# Patient Record
Sex: FEMALE | Race: WHITE
[De-identification: names, ages, dates, MRNs, and addresses within clinical notes are randomized per-mention and may not be internally consistent; named-entity substitution may affect disease eponyms.]

---

## 2019-03-24 ENCOUNTER — HOSPITAL ENCOUNTER (EMERGENCY)
Dept: HOSPITAL 33 - ED | Age: 69
Discharge: HOME | End: 2019-03-24
Payer: COMMERCIAL

## 2019-03-24 VITALS — DIASTOLIC BLOOD PRESSURE: 101 MMHG | HEART RATE: 74 BPM | OXYGEN SATURATION: 99 % | SYSTOLIC BLOOD PRESSURE: 166 MMHG

## 2019-03-24 DIAGNOSIS — R58: ICD-10-CM
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
## 2019-03-24 DIAGNOSIS — S52.532A: Primary | ICD-10-CM

## 2019-03-24 PROCEDURE — 99283 EMERGENCY DEPT VISIT LOW MDM: CPT

## 2019-03-24 PROCEDURE — 73130 X-RAY EXAM OF HAND: CPT

## 2019-03-24 PROCEDURE — 29126 APPL SHORT ARM SPLINT DYN: CPT

## 2019-03-24 PROCEDURE — 73090 X-RAY EXAM OF FOREARM: CPT

## 2019-03-24 NOTE — XRAY
Indication: Pain following fall.



Comparison: None



3 views of the left wrist demonstrates osteopenia, acute transverse fracture

distal metadiaphysis of the radius with minimal angulation, minimally

displaced ulnar styloid fracture, and soft tissue swelling.  Remaining wrist

unremarkable.

## 2019-03-24 NOTE — ERPHSYRPT
- History of Present Illness


Time Seen by Provider: 03/24/19 13:53


Source: patient


Exam Limitations: no limitations


Patient Subjective Stated Complaint: left arm pain


Triage Nursing Assessment: Patient ambulated back to ED and transferred to bed. 

Patient complains of left lower arm pain. Patient states she was in her garden 

on Friday and fell over chicken wire landing on her left arm. Patient states 

pain is 8/10. Left arm noted to be swollen and bruised. Positive CMS.


Physician History: 





left arm pain for 2 days. Patient complains of left lower arm pain. Patient 

states she was in her garden on Friday and fell over chicken wire landing on 

her left arm. Patient states pain is 8/10. Left arm noted to be swollen and 

bruised.


Occurred: last week


Method of Injury: fell


Quality: constant


Severity of Pain-Max: moderate


Severity of Pain-Current: moderate


Extremities Pain Location: forearm: left, wrist: left, hand: left


Modifying Factors: Improves With: nothing


Associated Symptoms: none


Allergies/Adverse Reactions: 








No Known Drug Allergies Allergy (Verified 03/24/19 13:35)


 





Hx Influenza Vaccination/Date Given: No


Hx Pneumococcal Vaccination/Date Given: No


Immunizations Up to Date: Yes





- Review of Systems


Constitutional: No Symptoms


Eyes: Other (racoon eyes)


Ears, Nose, & Throat: No Symptoms


Respiratory: No Symptoms


Cardiac: No Symptoms


Abdominal/Gastrointestinal: No Symptoms


Musculoskeletal: Deformity (left wrist), Fall, Joint Pain, Joint Swelling





- Past Medical History


Pertinent Past Medical History: No





- Past Surgical History


Past Surgical History: Yes


Genitourinary: Kidney Surgery





- Social History


Smoking Status: Never smoker


Exposure to second hand smoke: No


Drug Use: none


Patient Lives Alone: No





- Nursing Vital Signs


Nursing Vital Signs: 


 Initial Vital Signs











Temperature  98.0 F   03/24/19 13:36


 


Pulse Rate  74   03/24/19 13:36


 


Respiratory Rate  18   03/24/19 13:36


 


Blood Pressure  166/101   03/24/19 13:36


 


O2 Sat by Pulse Oximetry  99   03/24/19 13:36








 Pain Scale











Pain Intensity                 5

















- Physical Exam


General Appearance: no apparent distress


Eyes, Ears, Nose, Throat Exam: normal ENT inspection


Neck Exam: normal inspection


Cardiovascular/Respiratory Exam: chest non-tender


Abdominal Exam: non-tender


Back Exam: normal inspection


Elbow/Forearm Exam: bone tenderness, deformity, ecchymosis, limited ROM


Wrist Exam: deformity, ecchymosis, limited ROM, soft tissue tenderness


Hand Exam: normal ROM, ecchymosis, soft tissue tenderness


SpO2: 99





Procedures





- Splinting


Location of Splint: Left


Type of Splint: Orthoglass Short Arm Splint


Splint Applied By: ED Nurse


Pre-Proc Neuro Vasc Exam: normal


Post-Proc Neuro Vasc Exam: neurovascular intact





- Course


Nursing assessment & vital signs reviewed: Yes


Ordered Tests: 


 Active Orders 24 hr











 Category Date Time Status


 


 Splint STAT Care  03/24/19 13:52 Active


 


 FOREARM Stat Exams  03/24/19 13:51 Taken


 


 HAND (MINIMUM 3 VIEWS) Stat Exams  03/24/19 13:36 Taken














- Progress


Progress: improved


Counseled pt/family regarding: diagnosis, need for follow-up, rad results





- Departure


Time of Disposition: 14:09


Departure Disposition: Home


Clinical Impression: 


Colles' fracture of left radius


Qualifiers:


 Encounter type: initial encounter Fracture type: closed Qualified Code(s): 

S52.532A - Colles' fracture of left radius, initial encounter for closed 

fracture





Condition: Stable


Critical Care Time: No


Referrals: 


TYSON BREWER [Primary Care Provider] - 


TD JEFFERS [ACTIVE STAFF] - 


Instructions:  Colles' Fracture (DC), Radius Fracture (DC)


Prescriptions: 


Acetaminophen [Tylenol Arthritis] 650 mg PO QID #30 tablet.er

## 2019-03-24 NOTE — XRAY
Indication: Pain following fall.



Comparison: None



2 views of the left forearm demonstrates acute transverse fracture involving

the distal metadiaphysis of the radius with minimal angulation, minimally

displaced ulnar styloid fracture, and soft tissue swelling.  Elsewhere

osteopenia.  Remaining forearm unremarkable.

## 2020-02-29 ENCOUNTER — HOSPITAL ENCOUNTER (EMERGENCY)
Dept: HOSPITAL 33 - ED | Age: 70
Discharge: HOME | End: 2020-02-29
Payer: MEDICARE

## 2020-02-29 VITALS — DIASTOLIC BLOOD PRESSURE: 74 MMHG | HEART RATE: 60 BPM | SYSTOLIC BLOOD PRESSURE: 146 MMHG

## 2020-02-29 VITALS — OXYGEN SATURATION: 97 %

## 2020-02-29 DIAGNOSIS — M25.562: Primary | ICD-10-CM

## 2020-02-29 PROCEDURE — 73564 X-RAY EXAM KNEE 4 OR MORE: CPT

## 2020-02-29 PROCEDURE — 99283 EMERGENCY DEPT VISIT LOW MDM: CPT

## 2020-02-29 NOTE — XRAY
Indication: Pain.  No known injury.



Comparison: None



4 views of the left knee demonstrates mild osteopenia, minimal medial joint

space narrowing, and mild patella spurring.  No other bony, articular, or soft

tissue abnormalities.

## 2020-02-29 NOTE — ERPHSYRPT
- History of Present Illness


Time Seen by Provider: 02/29/20 15:11


Source: patient


Exam Limitations: no limitations


Patient Subjective Stated Complaint: Pt's left knee has been hurting for the 

past couple of weeks, has appt with her PCP in [place] on Monday and she 

figured he would send her to a hospital and so she wanted to come here instead 

of [place], pt denies any injury, denies hx of arthritis


Triage Nursing Assessment: Pt brought in to the ER by her , hypertensive

, pulses normal, no swelling in knee, states that when she walks she has pain 

and feels like her knee is going to give out, denies injury, no other issues at 

thsi time


Physician History: 





70 years old fairly healthy lady presented in the ER with chief complaint of 

left knee pain when she woke up this morning.  Pain is more on the lateral 

aspect of knee joint without any swelling.  No known trauma or twisting.  Hurts 

to walk and has taken Tylenol prior to arrival and is feeling better.


Method of Injury: unknown


Occurred: this morning


Quality: constant


Severity of Pain-Max: moderate


Severity of Pain-Current: mild


Lower Extremities Pain: knee: left


Modifying Factors: Improves With: movement, pain medication


Associated Symptoms: none


Allergies/Adverse Reactions: 








No Known Drug Allergies Allergy (Verified 02/29/20 15:17)


 





Hx Influenza Vaccination/Date Given: No


Hx Pneumococcal Vaccination/Date Given: No





- Past Medical History


Pertinent Past Medical History: Yes


Neurological History: No Pertinent History


Cardiac History: Myocardial Infarction (MI)


Respiratory History: No Pertinent History


Endocrine Medical History: No Pertinent History


Musculoskeletal History: No Pertinent History, Fractures


 History: Other





- Past Surgical History


Past Surgical History: Yes


Genitourinary: Kidney Surgery





- Social History


Smoking Status: Never smoker


Exposure to second hand smoke: No


Drug Use: none


Patient Lives Alone: No





- Female History


Hx Pregnant Now: No





- Nursing Vital Signs


Nursing Vital Signs: 


 Initial Vital Signs











Pulse Rate  81   02/29/20 14:56


 


Blood Pressure  155/88   02/29/20 14:56


 


O2 Sat by Pulse Oximetry  97   02/29/20 14:56








 Pain Scale











Pain Intensity                 5

















- Physical Exam


General Appearance: no apparent distress


Eyes, Ears, Nose, Throat Exam: normal ENT inspection, pharynx normal


Neck Exam: normal inspection


Cardiovascular/Respiratory Exam: chest non-tender, normal breath sounds, 

regular rate/rhythm


Back Exam: normal inspection


Hips Exam: bilateral: non-tender, normal inspection, normal range of motion


Legs Exam: bilateral leg: non-tender, normal inspection, normal range of motion


Knees Exam: right knee: non-tender, left knee: bone tenderness (Lateral tibial 

condyle/lateral joint line), pain, other (Negative anterior posterior drawer 

signs, negative Kaleigh sign, mild tenderness on the lateral aspect of joint.  

Negative varus/valgus stress test.), bilateral knee: normal inspection, normal 

range of motion, no evidence of injury


Ankle Exam: bilateral ankle: non-tender, normal inspection, normal range of 

motion


Foot Exam: bilateral foot: non-tender


Neuro/Tendon Exam: normal sensation, normal motor functions


Mental Status Exam: alert, oriented x 3, cooperative


Skin Exam: normal color


**SpO2 Interpretation**: normal


SpO2: 97


O2 Delivery: Room Air





- Course


Nursing assessment & vital signs reviewed: Yes


Ordered Tests: 


 Active Orders 24 hr











 Category Date Time Status


 


 KNEE (MIN 4 VIEW) Stat Exams  02/29/20 15:39 Taken








Medication Summary














Discontinued Medications














Generic Name Dose Route Start Last Admin





  Trade Name Roc  PRN Reason Stop Dose Admin


 


Hydrocodone Bitart/Acetaminophen  1 tab  02/29/20 15:16  02/29/20 15:20





  Norco 5/325 Mg***  PO  02/29/20 15:17  1 tab





  STAT ONE   Administration





     





     





     





     


 


Hydrocodone Bitart/Acetaminophen  Confirm  02/29/20 15:20  





  Norco 5/325 Mg***  Administered  02/29/20 15:21  





  Dose   





  1 tab   





  .ROUTE   





  .STK-MED ONE   





     





     





     





     














- Progress


Progress: improved, re-examined


Progress Note: 





02/29/20 16:15


Ruled out fracture dislocation.  I believe patient has ligamentous injury.  

Recommended taking Tylenol and meloxicam and outpatient follow-up with primary 

care and Ortho for further evaluation and may need MRI for detail.


Counseled pt/family regarding: diagnosis, need for follow-up, rad results





- Departure


Departure Disposition: Home


Clinical Impression: 


Acute knee pain


Qualifiers:


 Laterality: left Qualified Code(s): M25.562 - Pain in left knee





Condition: Stable


Critical Care Time: No


Referrals: 


JL ROONEY [Primary Care Provider] - Follow Up with PCP/3 days


Instructions:  Knee Sprain (DC)


Additional Instructions: 


Use knee brace which you have at home.  Take Tylenol/meloxicam as needed.  

Follow-up with primary care for reevaluation and may need an MRI of knee to 

further liquid to the pathology causing pain.  Return to ER for any worsening.  

Avoid any exertional activities.  Apply ice.


Prescriptions: 


Meloxicam 7.5 mg*** [Mobic 7.5 MG***] 7.5 mg PO DAILY PRN 10 Days  tablet MDD 15


 PRN Reason: Pain

## 2023-02-26 ENCOUNTER — HOSPITAL ENCOUNTER (OUTPATIENT)
Dept: HOSPITAL 33 - ED | Age: 73
Setting detail: OBSERVATION
LOS: 2 days | Discharge: HOME | End: 2023-02-28
Attending: FAMILY MEDICINE | Admitting: FAMILY MEDICINE
Payer: MEDICARE

## 2023-02-26 DIAGNOSIS — Z20.828: ICD-10-CM

## 2023-02-26 DIAGNOSIS — I25.2: ICD-10-CM

## 2023-02-26 DIAGNOSIS — R42: ICD-10-CM

## 2023-02-26 DIAGNOSIS — N39.0: Primary | ICD-10-CM

## 2023-02-26 LAB
ALBUMIN SERPL-MCNC: 4.2 G/DL (ref 3.5–5)
ALP SERPL-CCNC: 101 U/L (ref 38–126)
ALT SERPL-CCNC: 17 U/L (ref 0–35)
ANION GAP SERPL CALC-SCNC: 11.6 MEQ/L (ref 5–15)
AST SERPL QL: 31 U/L (ref 14–36)
BACTERIA UR CULT: (no result)
BASOPHILS # BLD AUTO: 0.02 X10^3/UL (ref 0–0.4)
BASOPHILS NFR BLD AUTO: 0.4 % (ref 0–0.4)
BILIRUB BLD-MCNC: 0.5 MG/DL (ref 0.2–1.3)
BUN SERPL-MCNC: 11 MG/DL (ref 7–17)
CALCIUM SPEC-MCNC: 8.6 MG/DL (ref 8.4–10.2)
CHLORIDE SERPL-SCNC: 106 MMOL/L (ref 98–107)
CO2 SERPL-SCNC: 27 MMOL/L (ref 22–30)
CREAT SERPL-MCNC: 0.63 MG/DL (ref 0.52–1.04)
EOSINOPHIL # BLD AUTO: 0.1 X10^3/UL (ref 0–0.5)
FLUAV AG NPH QL IA: NEGATIVE
FLUBV AG NPH QL IA: NEGATIVE
GFR SERPLBLD BASED ON 1.73 SQ M-ARVRAT: > 60 ML/MIN
GLUCOSE SERPL-MCNC: 126 MG/DL (ref 74–106)
HCT VFR BLD AUTO: 37.7 % (ref 35–47)
HGB BLD-MCNC: 12 G/DL (ref 12–16)
IMM GRANULOCYTES # BLD: 0.02 X10^3U/L (ref 0–0.03)
IMM GRANULOCYTES NFR BLD: 0.4 % (ref 0–0.4)
LYMPHOCYTES # SPEC AUTO: 1.99 X10^3/UL (ref 1–4.6)
MAGNESIUM SERPL-MCNC: 2.1 MG/DL (ref 1.6–2.3)
MCH RBC QN AUTO: 29 PG (ref 26–32)
MCHC RBC AUTO-ENTMCNC: 31.8 G/DL (ref 32–36)
MONOCYTES # BLD AUTO: 0.3 X10^3/UL (ref 0–1.3)
NRBC # BLD AUTO: 0 X10^3U/L (ref 0–0.01)
NRBC BLD AUTO-RTO: 0 % (ref 0–0.1)
PLATELET # BLD AUTO: 253 X10^3/UL (ref 150–450)
POTASSIUM SERPLBLD-SCNC: 3.6 MMOL/L (ref 3.5–5.1)
PROT SERPL-MCNC: 7 G/DL (ref 6.3–8.2)
RBC # BLD AUTO: 4.14 X10^6/UL (ref 4.1–5.4)
RBC # URNS HPF: (no result) /HPF (ref 0–5)
RSV AG SPEC QL IA: NEGATIVE
SARS-COV-2 AG RESP QL IA.RAPID: NEGATIVE
SODIUM SERPL-SCNC: 141 MMOL/L (ref 137–145)
WBC # BLD AUTO: 5.2 X10^3/UL (ref 4–10.5)
WBC URNS QL MICRO: >100 /HPF (ref 0–5)

## 2023-02-26 PROCEDURE — 99285 EMERGENCY DEPT VISIT HI MDM: CPT

## 2023-02-26 PROCEDURE — 0241U: CPT

## 2023-02-26 PROCEDURE — 87186 SC STD MICRODIL/AGAR DIL: CPT

## 2023-02-26 PROCEDURE — P9612 CATHETERIZE FOR URINE SPEC: HCPCS

## 2023-02-26 PROCEDURE — G0378 HOSPITAL OBSERVATION PER HR: HCPCS

## 2023-02-26 PROCEDURE — 82947 ASSAY GLUCOSE BLOOD QUANT: CPT

## 2023-02-26 PROCEDURE — 96360 HYDRATION IV INFUSION INIT: CPT

## 2023-02-26 PROCEDURE — 80053 COMPREHEN METABOLIC PANEL: CPT

## 2023-02-26 PROCEDURE — 96365 THER/PROPH/DIAG IV INF INIT: CPT

## 2023-02-26 PROCEDURE — 83605 ASSAY OF LACTIC ACID: CPT

## 2023-02-26 PROCEDURE — 70450 CT HEAD/BRAIN W/O DYE: CPT

## 2023-02-26 PROCEDURE — 85025 COMPLETE CBC W/AUTO DIFF WBC: CPT

## 2023-02-26 PROCEDURE — 97161 PT EVAL LOW COMPLEX 20 MIN: CPT

## 2023-02-26 PROCEDURE — 36000 PLACE NEEDLE IN VEIN: CPT

## 2023-02-26 PROCEDURE — 87086 URINE CULTURE/COLONY COUNT: CPT

## 2023-02-26 PROCEDURE — 81001 URINALYSIS AUTO W/SCOPE: CPT

## 2023-02-26 PROCEDURE — 84484 ASSAY OF TROPONIN QUANT: CPT

## 2023-02-26 PROCEDURE — 93005 ELECTROCARDIOGRAM TRACING: CPT

## 2023-02-26 PROCEDURE — 83735 ASSAY OF MAGNESIUM: CPT

## 2023-02-26 PROCEDURE — 96375 TX/PRO/DX INJ NEW DRUG ADDON: CPT

## 2023-02-26 PROCEDURE — 96374 THER/PROPH/DIAG INJ IV PUSH: CPT

## 2023-02-26 PROCEDURE — 36415 COLL VENOUS BLD VENIPUNCTURE: CPT

## 2023-02-26 PROCEDURE — 87077 CULTURE AEROBIC IDENTIFY: CPT

## 2023-02-26 PROCEDURE — 71045 X-RAY EXAM CHEST 1 VIEW: CPT

## 2023-02-26 RX ADMIN — DIAZEPAM SCH MG: 5 TABLET ORAL at 21:46

## 2023-02-26 NOTE — XRAY
Indication: Vertigo.  No known injury.



Multiple contiguous axial images obtained through the head without contrast.



Comparison: None



Age-appropriate global atrophy.  No acute intracranial hemorrhage, abnormal

extra-axial fluid collection, or mass effect.  Fourth ventricle is midline

without hydrocephalus.  Gray-white matter differentiation preserved.  Bony

calvarium intact.  Visualized paranasal sinuses and mastoid air cells are

clear.



Impression: Negative CT head without contrast exam.



Comment: Preliminary interpretation made by [facility].  No critical discrepancy.

## 2023-02-26 NOTE — XRAY
Indication: Vertigo.



Comparison: None



Portable chest hyperinflated and clear.  Heart not enlarged.  Bony thorax

intact with osteopenia, mild degenerative changes, moderate dextroscoliosis

centered at T5, and and L2 kyphoplasty.



Impression: Nonacute hyperinflated chest with chronic bony findings.

## 2023-02-26 NOTE — ERPHSYRPT
- History of Present Illness


Time Seen by Provider: 02/26/23 15:10


Patient Subjective Stated Complaint: pt reports dizziness and vomiting that is 

worse with movement. reports shes had a cold and been coughing for the last c

ouple weeks.


Triage Nursing Assessment: pt is aox3, pt answers questions appropriately, hand 

 strong and equal, pupils perrl, nystagmus present, afebrile, resps easy 

and non labored, radial pulses strong and equal, cap refill < 3 seconds, pt abd 

soft, non tender, pt skin is very pale, mucous membranes dry, gums appear pale, 

cool, dry.


Physician History: 





Patient is a 73-year-old female who presents by ambulance from home because of 

the sudden not set of repeated vomiting and retching and dizzy nests.  She also 

complains of a posterior headache.  She has been sick for 2 weeks with cold and 

cough and congestion.  Her symptoms are worse with movement.


Timing/Duration: other (Respiratory symptoms for 2 weeks dizziness and vertigo 

and nausea today)


Severity: severe


Modifying Factors: Improves With: movement


Associated Symptoms: nausea, vomiting, cough


Allergies/Adverse Reactions: 








No Known Drug Allergies Allergy (Verified 02/29/20 15:17)


   





Hx Tetanus, Diphtheria Vaccination/Date Given: Yes


Hx Influenza Vaccination/Date Given: Yes


Hx Pneumococcal Vaccination/Date Given: Yes


Immunizations Up to Date: Yes





Travel Risk





- International Travel


Have you traveled outside of the country in past 3 weeks: No





- Coronavirus Screening


Are you exhibiting any of the following symptoms?: No


Close contact with a COVID-19 positive Pt in past 14-21 Days: No





- Vaccine Status


Have you recieved a Covid-19 vaccination: Yes


: Unknown





- Vaccination Dates


Dates if Unknown: UNK





- Review of Systems


Constitutional: No Fever, No Chills


Eyes: No Symptoms


Ears, Nose, & Throat: Nose Congestion


Respiratory: Cough, No Dyspnea


Cardiac: No Chest Pain, No Edema, No Syncope


Abdominal/Gastrointestinal: Nausea, Vomiting, No Abdominal Pain, No Diarrhea


Genitourinary Symptoms: No Dysuria


Musculoskeletal: No Back Pain, No Neck Pain


Skin: No Rash


Neurological: Dizziness (Vertigo), Headache, Vertigo, No Focal Weakness, No Sens

ory Changes


Psychological: No Symptoms


Endocrine: No Symptoms


All Other Systems: Reviewed and Negative





- Past Medical History


Pertinent Past Medical History: Yes


Neurological History: No Pertinent History


Cardiac History: Myocardial Infarction (MI)


Respiratory History: No Pertinent History


Endocrine Medical History: No Pertinent History


Musculoskeletal History: Fractures, Osteoporosis


 History: Other


Other Medical History: MI 2005 - NO FURTHER TREATMENT/INTERVENTIONS





- Past Surgical History


Past Surgical History: Yes


Genitourinary: Kidney Surgery





- Social History


Smoking Status: Never smoker


Exposure to second hand smoke: No


Drug Use: none


Patient Lives Alone: No





- Nursing Vital Signs


Nursing Vital Signs: 


                               Initial Vital Signs











Temperature  97.0 F   02/26/23 15:04


 


Pulse Rate  68   02/26/23 15:04


 


Respiratory Rate  16   02/26/23 15:04


 


Blood Pressure  142/96   02/26/23 15:04


 


O2 Sat by Pulse Oximetry  100   02/26/23 15:04








                                   Pain Scale











Pain Intensity                 0

















- Physical Exam


General Appearance: moderate distress


Eye Exam: PERRL/EOMI, other (Patient displays constant horizontal nystagmus but 

increased with motion)


Ears, Nose, Throat Exam: normal ENT inspection, TMs normal, pharynx normal, 

moist mucous membranes


Neck Exam: normal inspection, non-tender, supple, full range of motion


Respiratory Exam: normal breath sounds, lungs clear, No respiratory distress


Cardiovascular Exam: regular rate/rhythm, normal heart sounds, normal peripheral

pulses


Gastrointestinal/Abdomen Exam: soft, normal bowel sounds, No tenderness, No mass


Pelvic Exam: not done


Rectal Exam: deferred


Extremity Exam: normal inspection, normal range of motion


Neurologic Exam: alert, oriented x 3, nml cerebellar function, other


Skin Exam: normal color, warm, dry


**SpO2 Interpretation**: normal


SpO2: 100


O2 Delivery: Room Air





- Course


Nursing assessment & vital signs reviewed: Yes


EKG Interpreted by Me: RATE (66), Sinus Rhythm, NORMAL AXIS, NORMAL INTERVALS, 

NORMAL QRS, NORMAL ST-T





- Radiology Exams


  ** Chest


X-ray Interpretation: Interpreted by me, Negative





- CT Exams


  ** Head


CT Interpretation: Tele-radiologist Report, Other (Reviewed telemetry 

radiologist report)


Ordered Tests: 


                               Active Orders 24 hr











 Category Date Time Status


 


 EKG-ER Only STAT Care  02/26/23 15:08 Active


 


 IV Insertion STAT Care  02/26/23 15:08 Active


 


 IV Insertion-2nd Peripheral STAT Care  02/26/23 15:23 Active


 


 POCT Glucose Check STAT Care  02/26/23 15:45 Active


 


 cath [Cath for Specimen-Straight] STAT Care  02/26/23 15:24 Active


 


 CHEST 1 VIEW (PORTABLE) Stat Exams  02/26/23 15:09 Taken


 


 HEAD WITHOUT CONTRAST [CT] Stat Exams  02/26/23 15:09 Taken


 


 CBC W DIFF Stat Lab  02/26/23 15:20 Completed


 


 CMP Stat Lab  02/26/23 15:20 Completed


 


 CULTURE,URINE Stat Lab  02/26/23 16:14 Received


 


 Lactic Acid Stat Lab  02/26/23 15:08 Ordered


 


 MAGNESIUM Stat Lab  02/26/23 15:20 Completed


 


 POCT GLUCOSE Stat Lab  02/26/23 15:05 Completed


 


 TROPONIN Q4H Lab  02/26/23 15:20 Completed


 


 TROPONIN Q4H Lab  02/26/23 19:15 Ordered


 


 TROPONIN Q4H Lab  02/26/23 23:15 Ordered


 


 UA W/RFX UR CULTURE Stat Lab  02/26/23 15:23 Completed








Medication Summary











Generic Name Dose Route Start Last Admin





  Trade Name Freq  PRN Reason Stop Dose Admin


 


Sodium Chloride  1,000 mls @ 100 mls/hr  02/26/23 15:15  02/26/23 15:27





  Sodium Chloride 0.9% 1000 Ml  IV  03/28/23 15:14  100 mls/hr





  .Q10H JOSH   Administration


 


Ceftriaxone Sodium/Dextrose  1 g in 50 mls @ 100 mls/hr  02/26/23 16:23  

02/26/23 16:25





  Rocephin 1 Gm-D5w 50 Ml Bag**  IV  02/26/23 16:52  100 ml/hr





  STAT STA   100 mls/hr





    Administration














Discontinued Medications














Generic Name Dose Route Start Last Admin





  Trade Name Freq  PRN Reason Stop Dose Admin


 


Droperidol  1.25 mg  02/26/23 15:08  02/26/23 15:28





  Droperidol 5 Mg/2 Ml Vial  IV  02/26/23 15:09  1.25 mg





  STAT ONE   Administration


 


Droperidol  Confirm  02/26/23 15:25 





  Droperidol 5 Mg/2 Ml Vial  Administered  02/26/23 15:26 





  Dose  





  5 mg  





  .ROUTE  





  .STK-MED ONE  


 


Ceftriaxone Sodium/Dextrose  Confirm  02/26/23 16:23 





  Rocephin 1 Gm-D5w 50 Ml Bag**  Administered  02/26/23 16:24 





  Dose  





  1 g in 50 mls @ ud  





  IV  





  .STK-MED ONE  


 


Metoclopramide HCl  10 mg  02/26/23 15:13  02/26/23 15:28





  Metoclopramide Hcl 10 Mg/2 Ml Vial  IV  02/26/23 15:14  10 mg





  STAT ONE   Administration


 


Metoclopramide HCl  Confirm  02/26/23 15:26 





  Metoclopramide Hcl 10 Mg/2 Ml Vial  Administered  02/26/23 15:27 





  Dose  





  10 mg  





  .ROUTE  





  .[facility]-MED ONE  











Lab/Rad Data: 


                           Laboratory Result Diagrams





                                 02/26/23 15:20 





                                 02/26/23 15:20 





                               Laboratory Results











  02/26/23 02/26/23 02/26/23 Range/Units





  15:23 15:20 15:20 


 


WBC     (4.0-10.5)  x10^3/uL


 


RBC     (4.1-5.4)  x10^6/uL


 


Hgb     (12.0-16.0)  g/dL


 


Hct     (35-47)  %


 


MCV     ()  fL


 


MCH     (26-32)  pg


 


MCHC     (32-36)  g/dL


 


RDW     (11.5-14.0)  %


 


Plt Count     (150-450)  x10^3/uL


 


MPV     (7.5-11.0)  fL


 


Gran %     (36.0-66.0)  %


 


Immature Gran % (Auto)     (0.00-0.4)  %


 


Nucleat RBC Rel Count     (0.00-0.1)  %


 


Eos # (Auto)     (0-0.5)  x10^3/uL


 


Immature Gran # (Auto)     (0.00-0.03)  x10^3u/L


 


Absolute Lymphs (auto)     (1.0-4.6)  x10^3/uL


 


Absolute Monos (auto)     (0.0-1.3)  x10^3/uL


 


Absolute Nucleated RBC     (0.00-0.01)  x10^3u/L


 


Lymphocytes %     (24.0-44.0)  %


 


Monocytes %     (0.0-12.0)  %


 


Eosinophils %     (0.00-5.0)  %


 


Basophils %     (0.0-0.4)  %


 


Absolute Granulocytes     (1.4-6.9)  x10^3/uL


 


Basophils #     (0-0.4)  x10^3/uL


 


Sodium    141  (137-145)  mmol/L


 


Potassium    3.6  (3.5-5.1)  mmol/L


 


Chloride    106  ()  mmol/L


 


Carbon Dioxide    27  (22-30)  mmol/L


 


Anion Gap    11.6  (5-15)  MEQ/L


 


BUN    11  (7-17)  mg/dL


 


Creatinine    0.63  (0.52-1.04)  mg/dL


 


Estimated GFR    > 60.0  ML/MIN


 


Glucose    126 H  ()  mg/dL


 


POC Glucometer     (74 to 106)  mg/dL


 


Calcium    8.6  (8.4-10.2)  mg/dL


 


Magnesium    2.1  (1.6-2.3)  mg/dL


 


Total Bilirubin    0.50  (0.2-1.3)  mg/dL


 


AST    31  (14-36)  U/L


 


ALT    17  (0-35)  U/L


 


Alkaline Phosphatase    101  ()  U/L


 


Troponin I   < 0.012   (0.000-0.034)  ng/mL


 


Serum Total Protein    7.0  (6.3-8.2)  g/dL


 


Albumin    4.2  (3.5-5.0)  g/dL


 


Urine Color  Yellow    (Yellow)  


 


Urine Appearance  Turbid A    (Clear)  


 


Urine pH  8.0    (4.6-8.0)  


 


Ur Specific Gravity  1.015    (1.005-1.030)  


 


Urine Protein  Trace A    (Negative)  


 


Urine Glucose (UA)  Negative    (Negative)  mg/dL


 


Urine Ketones  Negative    (Negative)  


 


Urine Blood  Trace    (Negative)  


 


Urine Nitrite  Positive A    (Negative)  


 


Urine Bilirubin  Negative    (Negative)  


 


Urine Urobilinogen  0.2    (0.2)  mg/dL


 


Ur Leukocyte Esterase  Large A    (Negative)  


 


U Hyaline Cast (Auto)  3-5 A    (0-2)  /LPF


 


Urine Microscopic RBC  0-2    (0-5)  /HPF


 


Urine Microscopic WBC  >100 A    (0-5)  /HPF


 


Ur Epithelial Cells  None Seen    (None Seen)  /HPF


 


Urine Bacteria  Many A    (None Seen)  /HPF


 


Urine Culture Reflexed  ORDERED SEPARATELY    (NO)  














  02/26/23 02/26/23 Range/Units





  15:20 15:05 


 


WBC  5.2   (4.0-10.5)  x10^3/uL


 


RBC  4.14   (4.1-5.4)  x10^6/uL


 


Hgb  12.0   (12.0-16.0)  g/dL


 


Hct  37.7   (35-47)  %


 


MCV  91.1   ()  fL


 


MCH  29.0   (26-32)  pg


 


MCHC  31.8 L   (32-36)  g/dL


 


RDW  13.1   (11.5-14.0)  %


 


Plt Count  253   (150-450)  x10^3/uL


 


MPV  9.9   (7.5-11.0)  fL


 


Gran %  53.2   (36.0-66.0)  %


 


Immature Gran % (Auto)  0.4   (0.00-0.4)  %


 


Nucleat RBC Rel Count  0.0   (0.00-0.1)  %


 


Eos # (Auto)  0.10   (0-0.5)  x10^3/uL


 


Immature Gran # (Auto)  0.02   (0.00-0.03)  x10^3u/L


 


Absolute Lymphs (auto)  1.99   (1.0-4.6)  x10^3/uL


 


Absolute Monos (auto)  0.30   (0.0-1.3)  x10^3/uL


 


Absolute Nucleated RBC  0.00   (0.00-0.01)  x10^3u/L


 


Lymphocytes %  38.3   (24.0-44.0)  %


 


Monocytes %  5.8   (0.0-12.0)  %


 


Eosinophils %  1.9   (0.00-5.0)  %


 


Basophils %  0.4   (0.0-0.4)  %


 


Absolute Granulocytes  2.77   (1.4-6.9)  x10^3/uL


 


Basophils #  0.02   (0-0.4)  x10^3/uL


 


Sodium    (137-145)  mmol/L


 


Potassium    (3.5-5.1)  mmol/L


 


Chloride    ()  mmol/L


 


Carbon Dioxide    (22-30)  mmol/L


 


Anion Gap    (5-15)  MEQ/L


 


BUN    (7-17)  mg/dL


 


Creatinine    (0.52-1.04)  mg/dL


 


Estimated GFR    ML/MIN


 


Glucose    ()  mg/dL


 


POC Glucometer   100  (74 to 106)  mg/dL


 


Calcium    (8.4-10.2)  mg/dL


 


Magnesium    (1.6-2.3)  mg/dL


 


Total Bilirubin    (0.2-1.3)  mg/dL


 


AST    (14-36)  U/L


 


ALT    (0-35)  U/L


 


Alkaline Phosphatase    ()  U/L


 


Troponin I    (0.000-0.034)  ng/mL


 


Serum Total Protein    (6.3-8.2)  g/dL


 


Albumin    (3.5-5.0)  g/dL


 


Urine Color    (Yellow)  


 


Urine Appearance    (Clear)  


 


Urine pH    (4.6-8.0)  


 


Ur Specific Gravity    (1.005-1.030)  


 


Urine Protein    (Negative)  


 


Urine Glucose (UA)    (Negative)  mg/dL


 


Urine Ketones    (Negative)  


 


Urine Blood    (Negative)  


 


Urine Nitrite    (Negative)  


 


Urine Bilirubin    (Negative)  


 


Urine Urobilinogen    (0.2)  mg/dL


 


Ur Leukocyte Esterase    (Negative)  


 


U Hyaline Cast (Auto)    (0-2)  /LPF


 


Urine Microscopic RBC    (0-5)  /HPF


 


Urine Microscopic WBC    (0-5)  /HPF


 


Ur Epithelial Cells    (None Seen)  /HPF


 


Urine Bacteria    (None Seen)  /HPF


 


Urine Culture Reflexed    (NO)  














- Progress


Progress: unchanged


Discussed with : Rodney


Will see patient in: hospital (observation)





Medical Desision Making





- Independent Historian


Additional History obtained from: Spouse





- Discussion of managment


Care discussed with:: on-call "doc"


Reviewed:: Test results


Agreed on:: Treatment plan, decision to admit, place in obs


Will see patient: in hospital





- Diagnostic Testing


Diagnostic test were ordered, analyzed, and reviewed by me: Yes


Radiological Interpretation: Interpreted by me, Reviewed by me, Teleradiologist 

Report





- Risk of complications


The pt has a mod risk of morbidity or mortality based on: Need for prescription 

drug management





- Departure


Departure Disposition: Observation


Clinical Impression: 


 Urinary tract infection, Upper respiratory infection, Acute labyrinthitis





Condition: Fair


Critical Care Time: No


Referrals: 


JL ROONEY [Primary Care Provider] - Follow up/PCP as directed

## 2023-02-27 LAB
ALBUMIN SERPL-MCNC: 3.3 G/DL (ref 3.5–5)
ALP SERPL-CCNC: 80 U/L (ref 38–126)
ALT SERPL-CCNC: 15 U/L (ref 0–35)
ANION GAP SERPL CALC-SCNC: 11.8 MEQ/L (ref 5–15)
AST SERPL QL: 25 U/L (ref 14–36)
BASOPHILS # BLD AUTO: 0.01 X10^3/UL (ref 0–0.4)
BASOPHILS NFR BLD AUTO: 0.1 % (ref 0–0.4)
BILIRUB BLD-MCNC: 0.5 MG/DL (ref 0.2–1.3)
BUN SERPL-MCNC: 12 MG/DL (ref 7–17)
CALCIUM SPEC-MCNC: 8.2 MG/DL (ref 8.4–10.2)
CHLORIDE SERPL-SCNC: 108 MMOL/L (ref 98–107)
CO2 SERPL-SCNC: 24 MMOL/L (ref 22–30)
CREAT SERPL-MCNC: 0.55 MG/DL (ref 0.52–1.04)
EOSINOPHIL # BLD AUTO: 0.01 X10^3/UL (ref 0–0.5)
GFR SERPLBLD BASED ON 1.73 SQ M-ARVRAT: > 60 ML/MIN
GLUCOSE SERPL-MCNC: 106 MG/DL (ref 74–106)
HCT VFR BLD AUTO: 33.6 % (ref 35–47)
HGB BLD-MCNC: 10.7 G/DL (ref 12–16)
IMM GRANULOCYTES # BLD: 0.01 X10^3U/L (ref 0–0.03)
IMM GRANULOCYTES NFR BLD: 0.1 % (ref 0–0.4)
LYMPHOCYTES # SPEC AUTO: 1.43 X10^3/UL (ref 1–4.6)
MCH RBC QN AUTO: 28.9 PG (ref 26–32)
MCHC RBC AUTO-ENTMCNC: 31.8 G/DL (ref 32–36)
MONOCYTES # BLD AUTO: 0.41 X10^3/UL (ref 0–1.3)
NRBC # BLD AUTO: 0 X10^3U/L (ref 0–0.01)
NRBC BLD AUTO-RTO: 0 % (ref 0–0.1)
PLATELET # BLD AUTO: 211 X10^3/UL (ref 150–450)
POTASSIUM SERPLBLD-SCNC: 3.8 MMOL/L (ref 3.5–5.1)
PROT SERPL-MCNC: 5.9 G/DL (ref 6.3–8.2)
RBC # BLD AUTO: 3.7 X10^6/UL (ref 4.1–5.4)
SODIUM SERPL-SCNC: 140 MMOL/L (ref 137–145)
WBC # BLD AUTO: 6.9 X10^3/UL (ref 4–10.5)

## 2023-02-27 RX ADMIN — CARBAMIDE PEROXIDE 6.5% OTIC SOLN SCH ML: 6.5 SOLUTION at 16:29

## 2023-02-27 RX ADMIN — CEFTRIAXONE SCH MLS/HR: 1 INJECTION, SOLUTION INTRAVENOUS at 10:53

## 2023-02-27 RX ADMIN — CARBAMIDE PEROXIDE 6.5% OTIC SOLN SCH ML: 6.5 SOLUTION at 21:14

## 2023-02-27 RX ADMIN — DIAZEPAM SCH: 5 TABLET ORAL at 17:18

## 2023-02-27 RX ADMIN — DIAZEPAM SCH MG: 5 TABLET ORAL at 10:55

## 2023-02-27 RX ADMIN — DIAZEPAM SCH MG: 5 TABLET ORAL at 21:13

## 2023-02-27 NOTE — PCM.HP
History of Present Illness





- Chief Complaint


Chief Complaint: UTI


History of Present Illness: 


 is a 73 year old female of Dr SHANNON in [place] who presented to ER with

sudden onset of vomiting and dizziness. ER evaluation - CBC diff and CMP were 

wnl , UA was nitrate and leukocyte positive. Patient has Hx left renal system 

surgery in the past and follows with Urology,rica Goodwin NP.








Medications & Allergies


Home Medications: 


                              Home Medication List





No Reportable Medications [No Reported Medications]  02/26/23 [History Confirmed

 02/26/23]








Allergies/Adverse Reactions: 


                                    Allergies











Allergy/AdvReac Type Severity Reaction Status Date / Time


 


No Known Drug Allergies Allergy   Verified 02/26/23 17:33














- Past Medical History


Past Medical History: Yes


Neurological History: No Pertinent History


ENT History: No Pertinent History


Cardiac History: Myocardial Infarction (MI)


Respiratory History: No Pertinent History


Endocrine Medical History: No Pertinent History


Musculoskelatal History: Fractures


GI Medical History: No Pertinent History


 History: Other


Pyscho-Social History: No Pertinent History


Reproductive Disorders: No Pertinent History


Comment: MI 2005 - NO FURTHER TREATMENT/INTERVENTIONS





- Female History


Are you pregnant now?: No





- Past Surgical History


Past Surgical History: Yes


Neuro Surgical History: No Pertinent History


Cardiac History: No Pertinent History


Respiratory Surgery: No Pertinent History


Genitourinary Surgical Hx: Kidney Surgery


Musculskeletal Surgical Hx: Other


Other Surgical History: Surgery to disks in lower back approximately 2 years 

ago.  Surgery to repair broken left wrist (screws placed) approximately 4 years 

ago.  Cyst removed from left kidney approximately 2006





- Social History


Smoking Status: Never smoker


Exposure to second hand smoke: No


Alcohol: None


Drug Use: none





- Physical Exam


Vital Signs: 


                               Vital Signs - 24 hr











  Temp Pulse Resp BP Pulse Ox


 


 02/27/23 03:50  97.7 F  79  16  144/67  96


 


 02/26/23 23:43  97.1 F  73  18  112/53  97


 


 02/26/23 19:44  97.4 F  77  18  122/63  96


 


 02/26/23 17:58  97.3 F  62  17  149/70  95


 


 02/26/23 17:32  97.3 F  62  17  149/70  95


 


 02/26/23 17:27  97.3 F  62  17  149/70  95


 


 02/26/23 17:00   76  16  150/100  99


 


 02/26/23 16:39  97.3 F  62  17  149/70  95


 


 02/26/23 16:38      100


 


 02/26/23 16:00   64  16   99


 


 02/26/23 15:04  97.0 F  68  16  142/96  100














Results





- Labs


Lab/Micro Results: 


                            Lab Results-Last 24 Hours











  02/26/23 02/26/23 02/26/23 Range/Units





  15:05 15:20 15:20 


 


WBC   5.2   (4.0-10.5)  x10^3/uL


 


RBC   4.14   (4.1-5.4)  x10^6/uL


 


Hgb   12.0   (12.0-16.0)  g/dL


 


Hct   37.7   (35-47)  %


 


MCV   91.1   ()  fL


 


MCH   29.0   (26-32)  pg


 


MCHC   31.8 L   (32-36)  g/dL


 


RDW   13.1   (11.5-14.0)  %


 


Plt Count   253   (150-450)  x10^3/uL


 


MPV   9.9   (7.5-11.0)  fL


 


Gran %   53.2   (36.0-66.0)  %


 


Immature Gran % (Auto)   0.4   (0.00-0.4)  %


 


Nucleat RBC Rel Count   0.0   (0.00-0.1)  %


 


Eos # (Auto)   0.10   (0-0.5)  x10^3/uL


 


Immature Gran # (Auto)   0.02   (0.00-0.03)  x10^3u/L


 


Absolute Lymphs (auto)   1.99   (1.0-4.6)  x10^3/uL


 


Absolute Monos (auto)   0.30   (0.0-1.3)  x10^3/uL


 


Absolute Nucleated RBC   0.00   (0.00-0.01)  x10^3u/L


 


Lymphocytes %   38.3   (24.0-44.0)  %


 


Monocytes %   5.8   (0.0-12.0)  %


 


Eosinophils %   1.9   (0.00-5.0)  %


 


Basophils %   0.4   (0.0-0.4)  %


 


Absolute Granulocytes   2.77   (1.4-6.9)  x10^3/uL


 


Basophils #   0.02   (0-0.4)  x10^3/uL


 


Sodium    141  (137-145)  mmol/L


 


Potassium    3.6  (3.5-5.1)  mmol/L


 


Chloride    106  ()  mmol/L


 


Carbon Dioxide    27  (22-30)  mmol/L


 


Anion Gap    11.6  (5-15)  MEQ/L


 


BUN    11  (7-17)  mg/dL


 


Creatinine    0.63  (0.52-1.04)  mg/dL


 


Estimated GFR    > 60.0  ML/MIN


 


Glucose    126 H  ()  mg/dL


 


POC Glucometer  100    (74 to 106)  mg/dL


 


Lactic Acid     (0.4-2.0)  


 


Calcium    8.6  (8.4-10.2)  mg/dL


 


Magnesium    2.1  (1.6-2.3)  mg/dL


 


Total Bilirubin    0.50  (0.2-1.3)  mg/dL


 


AST    31  (14-36)  U/L


 


ALT    17  (0-35)  U/L


 


Alkaline Phosphatase    101  ()  U/L


 


Troponin I     (0.000-0.034)  ng/mL


 


Serum Total Protein    7.0  (6.3-8.2)  g/dL


 


Albumin    4.2  (3.5-5.0)  g/dL


 


Urine Color     (Yellow)  


 


Urine Appearance     (Clear)  


 


Urine pH     (4.6-8.0)  


 


Ur Specific Gravity     (1.005-1.030)  


 


Urine Protein     (Negative)  


 


Urine Glucose (UA)     (Negative)  mg/dL


 


Urine Ketones     (Negative)  


 


Urine Blood     (Negative)  


 


Urine Nitrite     (Negative)  


 


Urine Bilirubin     (Negative)  


 


Urine Urobilinogen     (0.2)  mg/dL


 


Ur Leukocyte Esterase     (Negative)  


 


U Hyaline Cast (Auto)     (0-2)  /LPF


 


Urine Microscopic RBC     (0-5)  /HPF


 


Urine Microscopic WBC     (0-5)  /HPF


 


Ur Epithelial Cells     (None Seen)  /HPF


 


Urine Bacteria     (None Seen)  /HPF


 


Urine Culture Reflexed     (NO)  


 


Influenza Type A Ag     (NEGATIVE)  


 


Influenza Type B Ag     (NEGATIVE)  


 


RSV (PCR)     (Negative)  


 


SARS-CoV-2 (PCR)     (NEGATIVE)  














  02/26/23 02/26/23 02/26/23 Range/Units





  15:20 15:23 16:20 


 


WBC     (4.0-10.5)  x10^3/uL


 


RBC     (4.1-5.4)  x10^6/uL


 


Hgb     (12.0-16.0)  g/dL


 


Hct     (35-47)  %


 


MCV     ()  fL


 


MCH     (26-32)  pg


 


MCHC     (32-36)  g/dL


 


RDW     (11.5-14.0)  %


 


Plt Count     (150-450)  x10^3/uL


 


MPV     (7.5-11.0)  fL


 


Gran %     (36.0-66.0)  %


 


Immature Gran % (Auto)     (0.00-0.4)  %


 


Nucleat RBC Rel Count     (0.00-0.1)  %


 


Eos # (Auto)     (0-0.5)  x10^3/uL


 


Immature Gran # (Auto)     (0.00-0.03)  x10^3u/L


 


Absolute Lymphs (auto)     (1.0-4.6)  x10^3/uL


 


Absolute Monos (auto)     (0.0-1.3)  x10^3/uL


 


Absolute Nucleated RBC     (0.00-0.01)  x10^3u/L


 


Lymphocytes %     (24.0-44.0)  %


 


Monocytes %     (0.0-12.0)  %


 


Eosinophils %     (0.00-5.0)  %


 


Basophils %     (0.0-0.4)  %


 


Absolute Granulocytes     (1.4-6.9)  x10^3/uL


 


Basophils #     (0-0.4)  x10^3/uL


 


Sodium     (137-145)  mmol/L


 


Potassium     (3.5-5.1)  mmol/L


 


Chloride     ()  mmol/L


 


Carbon Dioxide     (22-30)  mmol/L


 


Anion Gap     (5-15)  MEQ/L


 


BUN     (7-17)  mg/dL


 


Creatinine     (0.52-1.04)  mg/dL


 


Estimated GFR     ML/MIN


 


Glucose     ()  mg/dL


 


POC Glucometer     (74 to 106)  mg/dL


 


Lactic Acid     (0.4-2.0)  


 


Calcium     (8.4-10.2)  mg/dL


 


Magnesium     (1.6-2.3)  mg/dL


 


Total Bilirubin     (0.2-1.3)  mg/dL


 


AST     (14-36)  U/L


 


ALT     (0-35)  U/L


 


Alkaline Phosphatase     ()  U/L


 


Troponin I  < 0.012    (0.000-0.034)  ng/mL


 


Serum Total Protein     (6.3-8.2)  g/dL


 


Albumin     (3.5-5.0)  g/dL


 


Urine Color   Yellow   (Yellow)  


 


Urine Appearance   Turbid A   (Clear)  


 


Urine pH   8.0   (4.6-8.0)  


 


Ur Specific Gravity   1.015   (1.005-1.030)  


 


Urine Protein   Trace A   (Negative)  


 


Urine Glucose (UA)   Negative   (Negative)  mg/dL


 


Urine Ketones   Negative   (Negative)  


 


Urine Blood   Trace   (Negative)  


 


Urine Nitrite   Positive A   (Negative)  


 


Urine Bilirubin   Negative   (Negative)  


 


Urine Urobilinogen   0.2   (0.2)  mg/dL


 


Ur Leukocyte Esterase   Large A   (Negative)  


 


U Hyaline Cast (Auto)   3-5 A   (0-2)  /LPF


 


Urine Microscopic RBC   0-2   (0-5)  /HPF


 


Urine Microscopic WBC   >100 A   (0-5)  /HPF


 


Ur Epithelial Cells   None Seen   (None Seen)  /HPF


 


Urine Bacteria   Many A   (None Seen)  /HPF


 


Urine Culture Reflexed   ORDERED SEPARATELY   (NO)  


 


Influenza Type A Ag    NEGATIVE  (NEGATIVE)  


 


Influenza Type B Ag    NEGATIVE  (NEGATIVE)  


 


RSV (PCR)    NEGATIVE  (Negative)  


 


SARS-CoV-2 (PCR)    NEGATIVE  (NEGATIVE)  














  02/26/23 02/26/23 02/26/23 Range/Units





  16:40 20:00 23:15 


 


WBC     (4.0-10.5)  x10^3/uL


 


RBC     (4.1-5.4)  x10^6/uL


 


Hgb     (12.0-16.0)  g/dL


 


Hct     (35-47)  %


 


MCV     ()  fL


 


MCH     (26-32)  pg


 


MCHC     (32-36)  g/dL


 


RDW     (11.5-14.0)  %


 


Plt Count     (150-450)  x10^3/uL


 


MPV     (7.5-11.0)  fL


 


Gran %     (36.0-66.0)  %


 


Immature Gran % (Auto)     (0.00-0.4)  %


 


Nucleat RBC Rel Count     (0.00-0.1)  %


 


Eos # (Auto)     (0-0.5)  x10^3/uL


 


Immature Gran # (Auto)     (0.00-0.03)  x10^3u/L


 


Absolute Lymphs (auto)     (1.0-4.6)  x10^3/uL


 


Absolute Monos (auto)     (0.0-1.3)  x10^3/uL


 


Absolute Nucleated RBC     (0.00-0.01)  x10^3u/L


 


Lymphocytes %     (24.0-44.0)  %


 


Monocytes %     (0.0-12.0)  %


 


Eosinophils %     (0.00-5.0)  %


 


Basophils %     (0.0-0.4)  %


 


Absolute Granulocytes     (1.4-6.9)  x10^3/uL


 


Basophils #     (0-0.4)  x10^3/uL


 


Sodium     (137-145)  mmol/L


 


Potassium     (3.5-5.1)  mmol/L


 


Chloride     ()  mmol/L


 


Carbon Dioxide     (22-30)  mmol/L


 


Anion Gap     (5-15)  MEQ/L


 


BUN     (7-17)  mg/dL


 


Creatinine     (0.52-1.04)  mg/dL


 


Estimated GFR     ML/MIN


 


Glucose     ()  mg/dL


 


POC Glucometer     (74 to 106)  mg/dL


 


Lactic Acid  1.9    (0.4-2.0)  


 


Calcium     (8.4-10.2)  mg/dL


 


Magnesium     (1.6-2.3)  mg/dL


 


Total Bilirubin     (0.2-1.3)  mg/dL


 


AST     (14-36)  U/L


 


ALT     (0-35)  U/L


 


Alkaline Phosphatase     ()  U/L


 


Troponin I   < 0.012  < 0.012  (0.000-0.034)  ng/mL


 


Serum Total Protein     (6.3-8.2)  g/dL


 


Albumin     (3.5-5.0)  g/dL


 


Urine Color     (Yellow)  


 


Urine Appearance     (Clear)  


 


Urine pH     (4.6-8.0)  


 


Ur Specific Gravity     (1.005-1.030)  


 


Urine Protein     (Negative)  


 


Urine Glucose (UA)     (Negative)  mg/dL


 


Urine Ketones     (Negative)  


 


Urine Blood     (Negative)  


 


Urine Nitrite     (Negative)  


 


Urine Bilirubin     (Negative)  


 


Urine Urobilinogen     (0.2)  mg/dL


 


Ur Leukocyte Esterase     (Negative)  


 


U Hyaline Cast (Auto)     (0-2)  /LPF


 


Urine Microscopic RBC     (0-5)  /HPF


 


Urine Microscopic WBC     (0-5)  /HPF


 


Ur Epithelial Cells     (None Seen)  /HPF


 


Urine Bacteria     (None Seen)  /HPF


 


Urine Culture Reflexed     (NO)  


 


Influenza Type A Ag     (NEGATIVE)  


 


Influenza Type B Ag     (NEGATIVE)  


 


RSV (PCR)     (Negative)  


 


SARS-CoV-2 (PCR)     (NEGATIVE)  














  02/27/23 02/27/23 Range/Units





  05:18 05:18 


 


WBC  6.9   (4.0-10.5)  x10^3/uL


 


RBC  3.70 L   (4.1-5.4)  x10^6/uL


 


Hgb  10.7 L   (12.0-16.0)  g/dL


 


Hct  33.6 L   (35-47)  %


 


MCV  90.8   ()  fL


 


MCH  28.9   (26-32)  pg


 


MCHC  31.8 L   (32-36)  g/dL


 


RDW  13.2   (11.5-14.0)  %


 


Plt Count  211   (150-450)  x10^3/uL


 


MPV  10.3   (7.5-11.0)  fL


 


Gran %  73.1 H   (36.0-66.0)  %


 


Immature Gran % (Auto)  0.1   (0.00-0.4)  %


 


Nucleat RBC Rel Count  0.0   (0.00-0.1)  %


 


Eos # (Auto)  0.01   (0-0.5)  x10^3/uL


 


Immature Gran # (Auto)  0.01   (0.00-0.03)  x10^3u/L


 


Absolute Lymphs (auto)  1.43   (1.0-4.6)  x10^3/uL


 


Absolute Monos (auto)  0.41   (0.0-1.3)  x10^3/uL


 


Absolute Nucleated RBC  0.00   (0.00-0.01)  x10^3u/L


 


Lymphocytes %  20.7 L   (24.0-44.0)  %


 


Monocytes %  5.9   (0.0-12.0)  %


 


Eosinophils %  0.1   (0.00-5.0)  %


 


Basophils %  0.1   (0.0-0.4)  %


 


Absolute Granulocytes  5.05   (1.4-6.9)  x10^3/uL


 


Basophils #  0.01   (0-0.4)  x10^3/uL


 


Sodium   140  (137-145)  mmol/L


 


Potassium   3.8  (3.5-5.1)  mmol/L


 


Chloride   108 H  ()  mmol/L


 


Carbon Dioxide   24  (22-30)  mmol/L


 


Anion Gap   11.8  (5-15)  MEQ/L


 


BUN   12  (7-17)  mg/dL


 


Creatinine   0.55  (0.52-1.04)  mg/dL


 


Estimated GFR   > 60.0  ML/MIN


 


Glucose   106  ()  mg/dL


 


POC Glucometer    (74 to 106)  mg/dL


 


Lactic Acid    (0.4-2.0)  


 


Calcium   8.2 L  (8.4-10.2)  mg/dL


 


Magnesium    (1.6-2.3)  mg/dL


 


Total Bilirubin   0.50  (0.2-1.3)  mg/dL


 


AST   25  (14-36)  U/L


 


ALT   15  (0-35)  U/L


 


Alkaline Phosphatase   80  ()  U/L


 


Troponin I    (0.000-0.034)  ng/mL


 


Serum Total Protein   5.9 L  (6.3-8.2)  g/dL


 


Albumin   3.3 L  (3.5-5.0)  g/dL


 


Urine Color    (Yellow)  


 


Urine Appearance    (Clear)  


 


Urine pH    (4.6-8.0)  


 


Ur Specific Gravity    (1.005-1.030)  


 


Urine Protein    (Negative)  


 


Urine Glucose (UA)    (Negative)  mg/dL


 


Urine Ketones    (Negative)  


 


Urine Blood    (Negative)  


 


Urine Nitrite    (Negative)  


 


Urine Bilirubin    (Negative)  


 


Urine Urobilinogen    (0.2)  mg/dL


 


Ur Leukocyte Esterase    (Negative)  


 


U Hyaline Cast (Auto)    (0-2)  /LPF


 


Urine Microscopic RBC    (0-5)  /HPF


 


Urine Microscopic WBC    (0-5)  /HPF


 


Ur Epithelial Cells    (None Seen)  /HPF


 


Urine Bacteria    (None Seen)  /HPF


 


Urine Culture Reflexed    (NO)  


 


Influenza Type A Ag    (NEGATIVE)  


 


Influenza Type B Ag    (NEGATIVE)  


 


RSV (PCR)    (Negative)  


 


SARS-CoV-2 (PCR)    (NEGATIVE)  








                                  Microbiology











 02/26/23 16:14 Urine Culture - Preliminary





 Catherized    GRAM NEGATIVE ID AND SENSITIVITY PENDING








                                   Accuchecks











Date                           02/26/23


 


Time                           15:30

















- Radiology Impressions


Radiology Exams & Impressions: 


                              Radiology Procedures











 Category Date Time Status


 


 CHEST 1 VIEW (PORTABLE) Stat Exams  02/26/23 15:09 Completed


 


 HEAD WITHOUT CONTRAST [CT] Stat Exams  02/26/23 15:09 Completed

## 2023-02-28 VITALS — OXYGEN SATURATION: 97 % | DIASTOLIC BLOOD PRESSURE: 68 MMHG | HEART RATE: 74 BPM | SYSTOLIC BLOOD PRESSURE: 152 MMHG

## 2023-02-28 LAB
ALBUMIN SERPL-MCNC: 3.1 G/DL (ref 3.5–5)
ALP SERPL-CCNC: 71 U/L (ref 38–126)
ALT SERPL-CCNC: 15 U/L (ref 0–35)
ANION GAP SERPL CALC-SCNC: 8.3 MEQ/L (ref 5–15)
AST SERPL QL: 25 U/L (ref 14–36)
BASOPHILS # BLD AUTO: 0.03 X10^3/UL (ref 0–0.4)
BASOPHILS NFR BLD AUTO: 0.6 % (ref 0–0.4)
BILIRUB BLD-MCNC: 0.4 MG/DL (ref 0.2–1.3)
BUN SERPL-MCNC: 7 MG/DL (ref 7–17)
CALCIUM SPEC-MCNC: 8.1 MG/DL (ref 8.4–10.2)
CHLORIDE SERPL-SCNC: 110 MMOL/L (ref 98–107)
CO2 SERPL-SCNC: 26 MMOL/L (ref 22–30)
CREAT SERPL-MCNC: 0.55 MG/DL (ref 0.52–1.04)
EOSINOPHIL # BLD AUTO: 0.09 X10^3/UL (ref 0–0.5)
GFR SERPLBLD BASED ON 1.73 SQ M-ARVRAT: > 60 ML/MIN
GLUCOSE SERPL-MCNC: 94 MG/DL (ref 74–106)
HCT VFR BLD AUTO: 33.3 % (ref 35–47)
HGB BLD-MCNC: 10.8 G/DL (ref 12–16)
IMM GRANULOCYTES # BLD: 0.01 X10^3U/L (ref 0–0.03)
IMM GRANULOCYTES NFR BLD: 0.2 % (ref 0–0.4)
LYMPHOCYTES # SPEC AUTO: 2.15 X10^3/UL (ref 1–4.6)
MCH RBC QN AUTO: 29.2 PG (ref 26–32)
MCHC RBC AUTO-ENTMCNC: 32.4 G/DL (ref 32–36)
MONOCYTES # BLD AUTO: 0.41 X10^3/UL (ref 0–1.3)
NRBC # BLD AUTO: 0 X10^3U/L (ref 0–0.01)
NRBC BLD AUTO-RTO: 0 % (ref 0–0.1)
PLATELET # BLD AUTO: 196 X10^3/UL (ref 150–450)
POTASSIUM SERPLBLD-SCNC: 3.5 MMOL/L (ref 3.5–5.1)
PROT SERPL-MCNC: 5.6 G/DL (ref 6.3–8.2)
RBC # BLD AUTO: 3.7 X10^6/UL (ref 4.1–5.4)
SODIUM SERPL-SCNC: 140 MMOL/L (ref 137–145)
WBC # BLD AUTO: 5.1 X10^3/UL (ref 4–10.5)

## 2023-02-28 RX ADMIN — CARBAMIDE PEROXIDE 6.5% OTIC SOLN SCH ML: 6.5 SOLUTION at 10:08

## 2023-02-28 RX ADMIN — CEFTRIAXONE SCH MLS/HR: 1 INJECTION, SOLUTION INTRAVENOUS at 10:08

## 2023-02-28 RX ADMIN — DIAZEPAM SCH MG: 5 TABLET ORAL at 10:17
